# Patient Record
Sex: FEMALE
[De-identification: names, ages, dates, MRNs, and addresses within clinical notes are randomized per-mention and may not be internally consistent; named-entity substitution may affect disease eponyms.]

---

## 2022-12-07 ENCOUNTER — NURSE TRIAGE (OUTPATIENT)
Dept: OTHER | Facility: CLINIC | Age: 37
End: 2022-12-07

## 2022-12-08 NOTE — TELEPHONE ENCOUNTER
Location of patient: [place]     Subjective: Caller states \" I am having a miscarriage. This is day 3 \" -of the miscarriage     Current Symptoms:   +a lot of vaginal bleeding with large clots - more bleeding at night   +dizziness at times   +large clots   +grey discharge   + 12 weeks pregnant \"but the baby  at 7weeks \"   +feel like I am having a baby   +severe abdominal pain     Associated Symptoms: NA    Pain Severity: 8/10; N/A; constant    Temperature: Denies      What has been tried: N/A     Recommended disposition: Call 911 - pt declined to call 911 - discussed that symptoms reported could potentially be life threatening or fatal. Pt states she will proceed to L&D- declines 911     Care advice provided, patient verbalizes understanding; denies any other questions or concerns.     Outcome:  pt will proceed to L&D     Reason for Disposition   [1] SEVERE abdominal pain (e.g., excruciating) AND [2] constant AND [3] present > 1 hour    Protocols used: Pregnancy - Vaginal Bleeding Greater Than 20 Weeks [id number]